# Patient Record
Sex: FEMALE | NOT HISPANIC OR LATINO | Employment: FULL TIME | URBAN - METROPOLITAN AREA
[De-identification: names, ages, dates, MRNs, and addresses within clinical notes are randomized per-mention and may not be internally consistent; named-entity substitution may affect disease eponyms.]

---

## 2024-10-23 RX ORDER — LIOTHYRONINE SODIUM 25 UG/1
25 TABLET ORAL DAILY
COMMUNITY

## 2024-10-23 RX ORDER — LEVOTHYROXINE SODIUM 100 UG/1
100 CAPSULE ORAL DAILY
COMMUNITY

## 2024-10-23 ASSESSMENT — DUKE ACTIVITY SCORE INDEX (DASI)
CAN YOU RUN A SHORT DISTANCE: YES
CAN YOU WALK INDOORS, SUCH AS AROUND YOUR HOUSE: YES
CAN YOU DO HEAVY WORK AROUND THE HOUSE LIKE SCRUBBING FLOORS OR LIFTING AND MOVING HEAVY FURNITURE: YES
CAN YOU TAKE CARE OF YOURSELF (EAT, DRESS, BATHE, OR USE TOILET): YES
CAN YOU CLIMB A FLIGHT OF STAIRS OR WALK UP A HILL: YES
CAN YOU DO MODERATE WORK AROUND THE HOUSE LIKE VACUUMING, SWEEPING FLOORS OR CARRYING GROCERIES: YES
CAN YOU HAVE SEXUAL RELATIONS: YES
CAN YOU DO LIGHT WORK AROUND THE HOUSE LIKE DUSTING OR WASHING DISHES: YES
CAN YOU WALK A BLOCK OR TWO ON LEVEL GROUND: YES
TOTAL_SCORE: 58.2
CAN YOU PARTICIPATE IN STRENOUS SPORTS LIKE SWIMMING, SINGLES TENNIS, FOOTBALL, BASKETBALL, OR SKIING: YES
CAN YOU PARTICIPATE IN MODERATE RECREATIONAL ACTIVITIES LIKE GOLF, BOWLING, DANCING, DOUBLES TENNIS OR THROWING A BASEBALL OR FOOTBALL: YES
DASI METS SCORE: 9.9
CAN YOU DO YARD WORK LIKE RAKING LEAVES, WEEDING OR PUSHING A MOWER: YES

## 2024-10-23 ASSESSMENT — LIFESTYLE VARIABLES: SMOKING_STATUS: NONSMOKER

## 2024-10-23 ASSESSMENT — ACTIVITIES OF DAILY LIVING (ADL): ADL_SCORE: 0

## 2024-10-24 ENCOUNTER — PRE-ADMISSION TESTING (OUTPATIENT)
Dept: PREADMISSION TESTING | Facility: HOSPITAL | Age: 44
End: 2024-10-24
Payer: COMMERCIAL

## 2024-10-24 ENCOUNTER — LAB (OUTPATIENT)
Dept: LAB | Facility: LAB | Age: 44
End: 2024-10-24
Payer: COMMERCIAL

## 2024-10-24 VITALS
WEIGHT: 132 LBS | DIASTOLIC BLOOD PRESSURE: 52 MMHG | TEMPERATURE: 97.3 F | HEART RATE: 79 BPM | BODY MASS INDEX: 22.53 KG/M2 | SYSTOLIC BLOOD PRESSURE: 112 MMHG | HEIGHT: 64 IN | RESPIRATION RATE: 14 BRPM | OXYGEN SATURATION: 100 %

## 2024-10-24 DIAGNOSIS — Z01.818 PRE-OP TESTING: Primary | ICD-10-CM

## 2024-10-24 DIAGNOSIS — Z01.818 PRE-OP TESTING: ICD-10-CM

## 2024-10-24 LAB
ERYTHROCYTE [DISTWIDTH] IN BLOOD BY AUTOMATED COUNT: 11.8 % (ref 11.5–14.5)
HCT VFR BLD AUTO: 37.7 % (ref 36–46)
HGB BLD-MCNC: 12.7 G/DL (ref 12–16)
MCH RBC QN AUTO: 32.2 PG (ref 26–34)
MCHC RBC AUTO-ENTMCNC: 33.7 G/DL (ref 32–36)
MCV RBC AUTO: 96 FL (ref 80–100)
NRBC BLD-RTO: 0 /100 WBCS (ref 0–0)
PLATELET # BLD AUTO: 223 X10*3/UL (ref 150–450)
RBC # BLD AUTO: 3.94 X10*6/UL (ref 4–5.2)
WBC # BLD AUTO: 6.5 X10*3/UL (ref 4.4–11.3)

## 2024-10-24 PROCEDURE — 36415 COLL VENOUS BLD VENIPUNCTURE: CPT

## 2024-10-24 PROCEDURE — 99202 OFFICE O/P NEW SF 15 MIN: CPT | Performed by: NURSE PRACTITIONER

## 2024-10-24 PROCEDURE — 85027 COMPLETE CBC AUTOMATED: CPT

## 2024-10-24 ASSESSMENT — PAIN SCALES - GENERAL: PAINLEVEL_OUTOF10: 0 - NO PAIN

## 2024-10-24 ASSESSMENT — PAIN - FUNCTIONAL ASSESSMENT: PAIN_FUNCTIONAL_ASSESSMENT: 0-10

## 2024-10-24 NOTE — H&P (VIEW-ONLY)
CPM/PAT Evaluation       Name: Eleonora Rodriguez (Eleonora Rodriguez)  /Age: 1980/44 y.o.     In-Person       Chief Complaint: pre-op appointment for upcoming OB/GYN surgery    HPI    RIZWANA is a 43 yo female who has had ongoing chronic pelvic pains since 12 years of age with the diagnosis of endometriosis. Testing has show uterine fibroids over the years and a known cystocele as well. She endorses daily pelvic pains with dyspareunia. With recent OBGYN follow up treatment options discussed and subsequently she has agree to pursue a laparoscopic hysterectomy with bilateral salpingo-oophorectomy and repair or cystocele. Presents to CPM today for perioperative risk stratification and optimization. She denies any recent know UTIs. Denies any dysuria or hematuria. Otherwise denies any recent illness, fever/chills, chest pains or shortness of breath.     Past Medical History:   Diagnosis Date    Anemia     Arthritis     Cystocele, unspecified     Delayed emergence from general anesthesia     Dyspareunia in female     Endometriosis     Fibroid     Hypothyroidism     Joint pain     PONV (postoperative nausea and vomiting)     Raynaud disease     Stress incontinence in female     Transfusion of blood product refused for Holiness reason      PCP: Dr. Banks  OBGYN: Dr. Srinivasan    Past Surgical History:   Procedure Laterality Date    APPENDECTOMY      BELT ABDOMINOPLASTY      COLONOSCOPY         Patient  reports being sexually active.    Family History   Problem Relation Name Age of Onset    Other (DYSLIPEDEMIA) Mother      Other (DYSLIPIDEMIA) Father         Allergies   Allergen Reactions    Latex Rash and Other    Sulfa (Sulfonamide Antibiotics) Rash       Prior to Admission medications    Medication Sig Start Date End Date Taking? Authorizing Provider   levothyroxine (Tirosint) 100 mcg capsule Take 1 capsule (100 mcg) by mouth early in the morning.. Take on an empty stomach at the same time each day, either 30 to 60  minutes prior to breakfast    Historical Provider, MD   liothyronine (Cytomel) 25 mcg tablet Take 1 tablet (25 mcg) by mouth once daily.    Historical Provider, MD DARRYL KEARNS   Constitutional: Negative for fever, chills, or sweats   ENMT: Negative for nasal discharge, congestion, ear pain, mouth pain, throat pain   Respiratory: Negative for cough, wheezing, shortness of breath   Cardiac: Negative for chest pain, dyspnea on exertion, palpitations   Gastrointestinal: Negative for nausea, vomiting, diarrhea, constipation, abdominal pain    Genitourinary: Negative for dysuria, flank pain, frequency, hematuria   + pelvic pains, pains with intercourse    Musculoskeletal: Negative for decreased ROM, pain, swelling, weakness   Neurological: Negative for dizziness, confusion, headache  Psychiatric: Negative for mood changes   Skin: Negative for itching, rash, ulcer    Hematologic/Lymph: Negative for bruising, easy bleeding  Allergic/Immunologic: Negative itching, sneezing, swelling      Physical Exam  Constitutional:       Appearance: Normal appearance.   HENT:      Head: Normocephalic.      Mouth/Throat:      Mouth: Mucous membranes are moist.   Eyes:      Extraocular Movements: Extraocular movements intact.   Cardiovascular:      Rate and Rhythm: Normal rate and regular rhythm.   Pulmonary:      Effort: Pulmonary effort is normal.      Breath sounds: Normal breath sounds.   Abdominal:      General: Abdomen is flat.      Palpations: Abdomen is soft.   Musculoskeletal:         General: Normal range of motion.      Cervical back: Normal range of motion.   Skin:     General: Skin is warm and dry.   Neurological:      General: No focal deficit present.      Mental Status: She is alert.   Psychiatric:         Mood and Affect: Mood normal.          PAT AIRWAY:   Airway:     Mallampati::  II    Neck ROM::  Full  normal        Visit Vitals  /52   Pulse 79   Temp 36.3 °C (97.3 °F) (Temporal)   Resp 14       DASI Risk Score       Flowsheet Row Pre-Admission Testing from 10/24/2024 in [facility]   Can you take care of yourself (eat, dress, bathe, or use toilet)?  2.75 filed at 10/23/2024 1418   Can you walk indoors, such as around your house? 1.75 filed at 10/23/2024 1418   Can you walk a block or two on level ground?  2.75 filed at 10/23/2024 1418   Can you climb a flight of stairs or walk up a hill? 5.5 filed at 10/23/2024 1418   Can you run a short distance? 8 filed at 10/23/2024 1418   Can you do light work around the house like dusting or washing dishes? 2.7 filed at 10/23/2024 1418   Can you do moderate work around the house like vacuuming, sweeping floors or carrying groceries? 3.5 filed at 10/23/2024 1418   Can you do heavy work around the house like scrubbing floors or lifting and moving heavy furniture?  8 filed at 10/23/2024 1418   Can you do yard work like raking leaves, weeding or pushing a mower? 4.5 filed at 10/23/2024 1418   Can you have sexual relations? 5.25 filed at 10/23/2024 1418   Can you participate in moderate recreational activities like golf, bowling, dancing, doubles tennis or throwing a baseball or football? 6 filed at 10/23/2024 1418   Can you participate in strenous sports like swimming, singles tennis, football, basketball, or skiing? 7.5 filed at 10/23/2024 1418   DASI SCORE 58.2 filed at 10/23/2024 1418   METS Score (Will be calculated only when all the questions are answered) 9.9 filed at 10/23/2024 1418          Caprini DVT Assessment      Flowsheet Row Pre-Admission Testing from 10/24/2024 in [facility]   DVT Score 7 filed at 10/23/2024 1417   Surgical Factors Major surgery planned, lasting over 3 hours filed at 10/23/2024 1417   BMI 30 or less filed at 10/23/2024 1417          Modified Frailty Index      Flowsheet Row Pre-Admission Testing from 10/24/2024 in [facility]   Non-independent functional status (problems with dressing, bathing, personal  grooming, or cooking) 0 filed at 10/23/2024 1418   History of diabetes mellitus  0 filed at 10/23/2024 1418   History of COPD 0 filed at 10/23/2024 1418   History of CHF No filed at 10/23/2024 1418   History of MI 0 filed at 10/23/2024 1418   History of Percutaneous Coronary Intervention, Cardiac Surgery, or Angina No filed at 10/23/2024 1418   Hypertension requiring the use of medication  0 filed at 10/23/2024 1418   Peripheral vascular disease 0 filed at 10/23/2024 1418   Impaired sensorium (cognitive impairement or loss, clouding, or delirium) 0 filed at 10/23/2024 1418   TIA or CVA withouy residual deficit 0 filed at 10/23/2024 1418   Cerebrovascular accident with deficit 0 filed at 10/23/2024 1418   Modified Frailty Index Calculator 0 filed at 10/23/2024 1418          CHADS2 Stroke Risk  Current as of today        N/A 3 to 100%: High Risk   2 to < 3%: Medium Risk   0 to < 2%: Low Risk     Last Change: N/A          This score determines the patient's risk of having a stroke if the patient has atrial fibrillation.        This score is not applicable to this patient. Components are not calculated.          Revised Cardiac Risk Index      Flowsheet Row Pre-Admission Testing from 10/24/2024 in [facility]   High-Risk Surgery (Intraperitoneal, Intrathoracic,Suprainguinal vascular) 0 filed at 10/23/2024 1418   History of ischemic heart disease (History of MI, History of positive exercuse test, Current chest paint considered due to myocardial ischemia, Use of nitrate therapy, ECG with pathological Q Waves) 0 filed at 10/23/2024 1418   History of congestive heart failure (pulmonary edemia, bilateral rales or S3 gallop, Paroxysmal nocturnal dyspnea, CXR showing pulmonary vascular redistribution) 0 filed at 10/23/2024 1418   History of cerebrovascular disease (Prior TIA or stroke) 0 filed at 10/23/2024 1418   Pre-operative insulin treatment 0 filed at 10/23/2024 1418   Pre-operative creatinine>2 mg/dl 0  filed at 10/23/2024 1418   Revised Cardiac Risk Calculator 0 filed at 10/23/2024 1418          Apfel Simplified Score      Flowsheet Row Pre-Admission Testing from 10/24/2024 in [facility]   Smoking status 1 filed at 10/23/2024 1418   History of motion sickness or PONV  0 filed at 10/23/2024 1418   Use of postoperative opioids 1 filed at 10/23/2024 1418   Gender - Female 1=Yes filed at 10/23/2024 1418   Apfel Simplified Score Calculator 3 filed at 10/23/2024 1418          Risk Analysis Index Results This Encounter         10/23/2024  1418             Do you live in a place other than your own home?: 0    When did you begin living in the place you are currently residing?: Greater than one year ago    Any kidney failure, kidney not working well, or seeing a kidney doctor (nephrologist)? If yes, was this for kidney stones or another problem?: 0 No    Any history of chronic (long-term) congestive heart failure (CHF)?: 0 No    Any shortness of breath when resting?: 0 No    In the past five years, have you been diagnosed with or treated for cancer?: No    During the last 3 months has it become difficult for you to remember things or organize your thoughts?: 0 No    Have you lost weight of 10 pounds or more in the past 3 months without trying?: 0 No    Do you have any loss of appetitie?: 0 No    Getting Around (Mobility): 0 Can get around without help    Eatin Can plan and prepare own meals    Toiletin Can use toilet without any help    Personal Hygiene (Bathing, Hand Washing, Changing Clothes): 0 Can shower or bathe without any help    PRINGLE Cancer History: Patient does not indicate history of cancer    Total Risk Analysis Index Score Without Cancer: 12    Total Risk Analysis Index Score: 12          Stop Bang Score      Flowsheet Row Pre-Admission Testing from 10/24/2024 in [facility]   Do you snore loudly? 0 filed at 10/24/2024 1054   Do you often feel tired or fatigued after your  sleep? 0 filed at 10/24/2024 1054   Has anyone ever observed you stop breathing in your sleep? 0 filed at 10/24/2024 1054   Do you have or are you being treated for high blood pressure? 0 filed at 10/24/2024 1054   Recent BMI (Calculated) 23 filed at 10/24/2024 1054   Is BMI greater than 35 kg/m2? 0=No filed at 10/24/2024 1054   Age older than 50 years old? 0=No filed at 10/24/2024 1054   Is your neck circumference greater than 17 inches (Male) or 16 inches (Female)? 0 filed at 10/24/2024 1054   Gender - Male 0=No filed at 10/24/2024 1054   STOP-BANG Total Score 0 filed at 10/24/2024 1054          Prodigy: High Risk  Total Score: 0          ARISCAT Score for Postoperative Pulmonary Complications      Flowsheet Row Pre-Admission Testing from 10/24/2024 in [facility]   Age, years  0 filed at 10/23/2024 1419   Preoperative SpO2 0 filed at 10/23/2024 1419   Respiratory infection in the last month Either upper or lower (i.e., URI, bronchitis, pneumonia), with fever and antibiotic treatment 0 filed at 10/23/2024 1419   Preoperative anemoa (Hgb less than 10 g/dl) 0 filed at 10/23/2024 1419   Surgical incision  15 filed at 10/23/2024 1419   Duration of surgery  16 filed at 10/23/2024 1419   Emergency Procedure  0 filed at 10/23/2024 1419   ARISCAT Total Score  28 filed at 10/23/2024 1419          Tatiana Perioperative Risk for Myocardial Infarction or Cardiac Arrest (MARTHA)      Flowsheet Row Pre-Admission Testing from 10/24/2024 in [facility]   Age 0.92 filed at 10/23/2024 1419   Functional Status  0 filed at 10/23/2024 1419   ASA Class  -5.17 filed at 10/23/2024 1419   Creatinine 0 filed at 10/23/2024 1419   Type of Procedure  1.13 filed at 10/23/2024 1419   MARTHA Total Score  -4.04 filed at 10/23/2024 1419            Assessment and Plan:     Pre-Op  44-year-old female scheduled for robotic laparoscopic hysterectomy, bilateral salpingo-oophorectomy with TVT and AP repair and cystoscopy on  2024 with Dr. Mcguire and Dr. Srinivasan. Blood work ordered. She has refused T&S related to refusion all blood product transfusions. Urine hcg ordered for the morning of surgery. Otherwise no further orders indicated.     Cardiac  -No diagnosis or significant findings on chart review or clinical presentation and evaluation.  DASI Score: 58.2   MET Score: 9.9  RCRI  0 which is 3.9% 30 day risk of MACE (risk for cardiac death, nonfatal myocardial infarction, and nonfactal cardiac arrest)  MARTHA score which indicates a   0% risk of intraoperative or 30-day postoperative MACE    Pulmonary  -No diagnosis or significant findings on chart review or clinical presentation and evaluation.  -Preoperative deep breathing educational handout provided to patient.  STOP BAN   points which is a low risk for moderate to severe GEOFF  ARISCAT:   28   points which is a intermediate (13.3%) risk of in-hospital post-op pulmonary complications     Endocrine  -Hypothyroidism, compliant with taking Synthroid and Cytomel    GI  Apfel: 3 points 61% risk for post operative N/V    /Renal  -Urinary stress incontinence    Hematologic    REFUSING ANY BLOOD PRODUCT TRANSFUSIONS- JEHOVAH WITNESS    -No hematological medical history, however due to high Caprini score of 7 patient is at HIGH risk for perioperative DVT, informative education handout provided    OB/ GYN  -Endometriosis with chronic pelvic pains, dx at 12 years of age  -Dyspareunia  -Uterine fibroids  -Cystocele  -Reason for upcoming surgery     Skin check: Patient was instructed to make surgeon aware of any skin changes/concerns prior to surgery.     Anesthesia:  Patient endorses delayed emergence and PONV    See risk scores as previously documented.

## 2024-10-24 NOTE — PREPROCEDURE INSTRUCTIONS
Thank you for visiting Preadmission Testing at [facility]. If you have any changes to your health condition, please call the SURGEON's office to alert them and give them details of your symptoms.        Preoperative Brain Exercises    What are brain exercises?  A brain exercise is any activity that engages your thinking (cognitive) skills.    What types of activities are considered brain exercises?  Jigsaw puzzles, crossword puzzles, word jumble, memory games, word search, and many more.  Many can be found free online or on your phone via a mobile fortino.    Why should I do brain exercises before my surgery?  More recent research has shown brain exercise before surgery can lower the risk of postoperative delirium (confusion) which can be especially important for older adults.  Patients who did brain exercises for 5 to 10 hours the days before surgery, cut their risk of postoperative delirium in half up to 1 week after surgery.      Preoperative Deep Breathing Exercises    Why it is important to do deep breathing exercises before my surgery?  Deep breathing exercises strengthen your breathing muscles.  This helps you to recover after your surgery and decreases the chance of breathing complications.    How are the deep breathing exercises done?  Sit straight with your back supported.  Breathe in deeply and slowly through your nose. Your lower rib cage should expand and your abdomen may move forward.  Hold that breath for 3 to 5 seconds.  Breathe out through pursed lips, slowly and completely.  Rest and repeat 10 times every hour while awake.  Rest longer if you become dizzy or lightheaded.      Patient and Family Education   Ways You Can Help Prevent Blood Clots     This handout explains some simple things you can do to help prevent blood clots.      Blood clots are blockages that can form in the body's veins. When a blood clot forms in your deep veins, it may be called a deep vein thrombosis, or DVT for short. Blood clots can  happen in any part of the body where blood flows, but they are most common in the arms and legs. If a piece of a blood clot breaks free and travels to the lungs, it is called a pulmonary embolus (PE). A PE can be a very serious problem.      Being in the hospital or having surgery can raise your chances of getting a blood clot because you may not be well enough to move around as much as you normally do.      Ways you can help prevent blood clots in the hospital         Wearing SCDs. SCDs stands for Sequential Compression Devices.   SCDs are special sleeves that wrap around your legs  They attach to a pump that fills them with air to gently squeeze your legs every few minutes.   This helps return the blood in your legs to your heart.   SCDs should only be taken off when walking or bathing.   SCDs may not be comfortable, but they can help save your life.               Wearing compression stockings - if your doctor orders them. These special snug fitting stockings gently squeeze your legs to help blood flow.       Walking. Walking helps move the blood in your legs.   If your doctor says it is ok, try walking the halls at least   5 times a day. Ask us to help you get up, so you don't fall.      Taking any blood thinning medicines your doctor orders.          ©[facility]; 3/23        Ways you can help prevent blood clots at home       Wearing compression stockings - if your doctor orders them. ? Walking - to help move the blood in your legs.       Taking any blood thinning medicines your doctor orders.      Signs of a blood clot or PE      Tell your doctor or nurse know right away if you have of the problems listed below.    If you are at home, seek medical care right away. Call 911 for chest pain or problems breathing.          Signs of a blood clot (DVT) - such as pain,  swelling, redness or warmth in your arm or leg      Signs of a pulmonary embolism (PE) - such as chest     pain or feeling short of breath

## 2024-10-24 NOTE — PREPROCEDURE INSTRUCTIONS
Medication List            Accurate as of October 24, 2024 11:04 AM. Always use your most recent med list.                levothyroxine 100 mcg capsule  Commonly known as: Tirosint  Medication Adjustments for Surgery: Take/Use as prescribed     liothyronine 25 mcg tablet  Commonly known as: Cytomel  Medication Adjustments for Surgery: Take/Use as prescribed                                PRE-OPERATIVE INSTRUCTIONS    You will receive notification one business day prior to your procedure to confirm your arrival time. It is important that you answer your phone and/or check your messages during this time. If you do not hear from the surgery center by 5 pm. the day before your procedure, please call [phone].     Please enter the building through the Outpatient entrance and take the elevator off the lobby to the 2nd floor then check in at the Outpatient Surgery desk on the 2nd floor.    INSTRUCTIONS:  Talk to your surgeon for instructions if you should stop your aspirin, blood thinner, or diabetes medicines.  DO NOT take any multivitamins or over the counter supplements for 7-10 days before surgery.  If not being admitted, you must have an adult immediately available to drive you home after surgery. We also highly recommend you have someone stay with you for the entire day and night of your surgery.  For children having surgery, a parent or legal guardian must accompany them to the surgery center. If this is not possible, please call [phone] to make additional arrangements.  For adults who are unable to consent or make medical decisions for themselves, a legal guardian or Power of  must accompany them to the surgery center. If this is not possible, please call [phone] to make additional arrangements.  Wear comfortable, loose fitting clothing.  All jewelry and piercings must be removed. If you are unable to remove an item or have a dermal piercing, please be sure to tell the nurse when you arrive  for surgery.  Nail polish and make-up must be removed.  Avoid smoking or consuming alcohol for 24 hours before surgery.  To help prevent infection, please take a shower/bath and wash your hair the night before and/or morning of surgery (or follow other specific bathing instructions provided).    Preoperative Fasting Guidelines    Why must I stop eating and drinking near surgery time?  With sedation, food or liquid in your stomach can enter your lungs causing serious complications  Increases nausea and vomiting    When do I need to stop eating and drinking before my surgery?  Do not eat any solid food after midnight the night before your surgery/procedure unless otherwise instructed by your surgeon.   You may have up to 13.5 ounces of clear liquid until TWO hours before your instructed arrival time to the hospital.  This includes water, black tea/coffee, (no milk or cream) apple juice, and electrolyte drinks (Gatorade).   You may chew gum until TWO hours before your surgery/procedure      If applicable, notify your surgeons office immediately of any new skin changes that occur to the surgical limb.      If you have any questions or concerns, please call Pre-Admission Testing at [phone].           Home Preoperative Antibacterial Shower with Chlorhexidine gluconate (CHG)     What is a home preoperative antibacterial shower?  This shower is a way of cleaning the skin with a germ killing solution before surgery. The solution contains chlorhexidine gluconate, commonly known as CHG. CHG is a skin cleanser with germ killing ability. Let your doctor know if you are allergic to chlorhexidine.    Why do I need to take a preoperative antibacterial shower?  Skin is not sterile. It is best to try to make your skin as free of germs as possible before surgery. Proper cleansing with a germ killing soap before surgery can lower the number of germs on your skin. This helps to reduce the risk of infection at the surgical site.  Following the instructions listed below will help you prepare your skin for surgery.    How do I use the solution?  Begin using your CHG soap the night before and again the morning of your procedure.   Do not shave the day before or day of surgery.  Remove all jewelry until after surgery. Take off rings and take out all body-piercing jewelry.  Wash your face and hair with normal soap and shampoo before you use the CHG soap.  Apply the CHG solution to a clean wet washcloth. Move away from the water to avoid premature rinsing of the CHG soap as you are applying. Firmly lather your entire body from the neck down. Do not use CHG on your face, eyes, ears, or genitals.   Pay special attention to the area where your incisions will be located.  Do not scrub your skin too hard.  It is important to allow the CHG soap to sit on your skin for 3-5 minutes.  Rinse the solution off your body completely. Do not wash with your normal soap after the CHG soap solution.  Pat yourself dry with a clean, soft towel.  Do not apply powders, lotions or deodorants as these might block how the CHG soap works.   Dress in clean clothing.  Be sure to sleep with clean, freshly laundered sheets.  Be aware that CHG can cause stains on fabric. Rinse your washcloth and other linens that have contact with CHG completely. Use only non-chlorine detergents to launder the items used.

## 2024-10-24 NOTE — CPM/PAT H&P
CPM/PAT Evaluation       Name: Eleonora Rodriguez (Eleonora Rodriguez)  /Age: 1980/44 y.o.     In-Person       Chief Complaint: pre-op appointment for upcoming OB/GYN surgery    HPI    RIZWANA is a 45 yo female who has had ongoing chronic pelvic pains since 12 years of age with the diagnosis of endometriosis. Testing has show uterine fibroids over the years and a known cystocele as well. She endorses daily pelvic pains with dyspareunia. With recent OBGYN follow up treatment options discussed and subsequently she has agree to pursue a laparoscopic hysterectomy with bilateral salpingo-oophorectomy and repair or cystocele. Presents to CPM today for perioperative risk stratification and optimization. She denies any recent know UTIs. Denies any dysuria or hematuria. Otherwise denies any recent illness, fever/chills, chest pains or shortness of breath.     Past Medical History:   Diagnosis Date    Anemia     Arthritis     Cystocele, unspecified     Delayed emergence from general anesthesia     Dyspareunia in female     Endometriosis     Fibroid     Hypothyroidism     Joint pain     PONV (postoperative nausea and vomiting)     Raynaud disease     Stress incontinence in female     Transfusion of blood product refused for Bahai reason      PCP: Dr. Banks  OBGYN: Dr. Srinivasan    Past Surgical History:   Procedure Laterality Date    APPENDECTOMY      BELT ABDOMINOPLASTY      COLONOSCOPY         Patient  reports being sexually active.    Family History   Problem Relation Name Age of Onset    Other (DYSLIPEDEMIA) Mother      Other (DYSLIPIDEMIA) Father         Allergies   Allergen Reactions    Latex Rash and Other    Sulfa (Sulfonamide Antibiotics) Rash       Prior to Admission medications    Medication Sig Start Date End Date Taking? Authorizing Provider   levothyroxine (Tirosint) 100 mcg capsule Take 1 capsule (100 mcg) by mouth early in the morning.. Take on an empty stomach at the same time each day, either 30 to 60  minutes prior to breakfast    Historical Provider, MD   liothyronine (Cytomel) 25 mcg tablet Take 1 tablet (25 mcg) by mouth once daily.    Historical Provider, MD DARRYL KEARNS   Constitutional: Negative for fever, chills, or sweats   ENMT: Negative for nasal discharge, congestion, ear pain, mouth pain, throat pain   Respiratory: Negative for cough, wheezing, shortness of breath   Cardiac: Negative for chest pain, dyspnea on exertion, palpitations   Gastrointestinal: Negative for nausea, vomiting, diarrhea, constipation, abdominal pain    Genitourinary: Negative for dysuria, flank pain, frequency, hematuria   + pelvic pains, pains with intercourse    Musculoskeletal: Negative for decreased ROM, pain, swelling, weakness   Neurological: Negative for dizziness, confusion, headache  Psychiatric: Negative for mood changes   Skin: Negative for itching, rash, ulcer    Hematologic/Lymph: Negative for bruising, easy bleeding  Allergic/Immunologic: Negative itching, sneezing, swelling      Physical Exam  Constitutional:       Appearance: Normal appearance.   HENT:      Head: Normocephalic.      Mouth/Throat:      Mouth: Mucous membranes are moist.   Eyes:      Extraocular Movements: Extraocular movements intact.   Cardiovascular:      Rate and Rhythm: Normal rate and regular rhythm.   Pulmonary:      Effort: Pulmonary effort is normal.      Breath sounds: Normal breath sounds.   Abdominal:      General: Abdomen is flat.      Palpations: Abdomen is soft.   Musculoskeletal:         General: Normal range of motion.      Cervical back: Normal range of motion.   Skin:     General: Skin is warm and dry.   Neurological:      General: No focal deficit present.      Mental Status: She is alert.   Psychiatric:         Mood and Affect: Mood normal.          PAT AIRWAY:   Airway:     Mallampati::  II    Neck ROM::  Full  normal        Visit Vitals  /52   Pulse 79   Temp 36.3 °C (97.3 °F) (Temporal)   Resp 14       DASI Risk Score       Flowsheet Row Pre-Admission Testing from 10/24/2024 in [facility]   Can you take care of yourself (eat, dress, bathe, or use toilet)?  2.75 filed at 10/23/2024 1418   Can you walk indoors, such as around your house? 1.75 filed at 10/23/2024 1418   Can you walk a block or two on level ground?  2.75 filed at 10/23/2024 1418   Can you climb a flight of stairs or walk up a hill? 5.5 filed at 10/23/2024 1418   Can you run a short distance? 8 filed at 10/23/2024 1418   Can you do light work around the house like dusting or washing dishes? 2.7 filed at 10/23/2024 1418   Can you do moderate work around the house like vacuuming, sweeping floors or carrying groceries? 3.5 filed at 10/23/2024 1418   Can you do heavy work around the house like scrubbing floors or lifting and moving heavy furniture?  8 filed at 10/23/2024 1418   Can you do yard work like raking leaves, weeding or pushing a mower? 4.5 filed at 10/23/2024 1418   Can you have sexual relations? 5.25 filed at 10/23/2024 1418   Can you participate in moderate recreational activities like golf, bowling, dancing, doubles tennis or throwing a baseball or football? 6 filed at 10/23/2024 1418   Can you participate in strenous sports like swimming, singles tennis, football, basketball, or skiing? 7.5 filed at 10/23/2024 1418   DASI SCORE 58.2 filed at 10/23/2024 1418   METS Score (Will be calculated only when all the questions are answered) 9.9 filed at 10/23/2024 1418          Caprini DVT Assessment      Flowsheet Row Pre-Admission Testing from 10/24/2024 in [facility]   DVT Score 7 filed at 10/23/2024 1417   Surgical Factors Major surgery planned, lasting over 3 hours filed at 10/23/2024 1417   BMI 30 or less filed at 10/23/2024 1417          Modified Frailty Index      Flowsheet Row Pre-Admission Testing from 10/24/2024 in [facility]   Non-independent functional status (problems with dressing, bathing, personal  grooming, or cooking) 0 filed at 10/23/2024 1418   History of diabetes mellitus  0 filed at 10/23/2024 1418   History of COPD 0 filed at 10/23/2024 1418   History of CHF No filed at 10/23/2024 1418   History of MI 0 filed at 10/23/2024 1418   History of Percutaneous Coronary Intervention, Cardiac Surgery, or Angina No filed at 10/23/2024 1418   Hypertension requiring the use of medication  0 filed at 10/23/2024 1418   Peripheral vascular disease 0 filed at 10/23/2024 1418   Impaired sensorium (cognitive impairement or loss, clouding, or delirium) 0 filed at 10/23/2024 1418   TIA or CVA withouy residual deficit 0 filed at 10/23/2024 1418   Cerebrovascular accident with deficit 0 filed at 10/23/2024 1418   Modified Frailty Index Calculator 0 filed at 10/23/2024 1418          CHADS2 Stroke Risk  Current as of today        N/A 3 to 100%: High Risk   2 to < 3%: Medium Risk   0 to < 2%: Low Risk     Last Change: N/A          This score determines the patient's risk of having a stroke if the patient has atrial fibrillation.        This score is not applicable to this patient. Components are not calculated.          Revised Cardiac Risk Index      Flowsheet Row Pre-Admission Testing from 10/24/2024 in [facility] [place]   High-Risk Surgery (Intraperitoneal, Intrathoracic,Suprainguinal vascular) 0 filed at 10/23/2024 1418   History of ischemic heart disease (History of MI, History of positive exercuse test, Current chest paint considered due to myocardial ischemia, Use of nitrate therapy, ECG with pathological Q Waves) 0 filed at 10/23/2024 1418   History of congestive heart failure (pulmonary edemia, bilateral rales or S3 gallop, Paroxysmal nocturnal dyspnea, CXR showing pulmonary vascular redistribution) 0 filed at 10/23/2024 1418   History of cerebrovascular disease (Prior TIA or stroke) 0 filed at 10/23/2024 1418   Pre-operative insulin treatment 0 filed at 10/23/2024 1418   Pre-operative creatinine>2 mg/dl 0  filed at 10/23/2024 1418   Revised Cardiac Risk Calculator 0 filed at 10/23/2024 1418          Apfel Simplified Score      [place] [facility] [place]   Smoking status 1 filed at 10/23/2024 1418   History of motion sickness or PONV  0 filed at 10/23/2024 1418   Use of postoperative opioids 1 filed at 10/23/2024 1418   Gender - Female 1=Yes filed at 10/23/2024 1418   Apfel Simplified Score Calculator 3 filed at 10/23/2024 1418          Risk Analysis Index Results This Encounter         10/23/2024  1418             Do you live in a place other than your own home?: 0    When did you begin living in the place you are currently residing?: Greater than one year ago    Any kidney failure, kidney not working well, or seeing a kidney doctor (nephrologist)? If yes, was this for kidney stones or another problem?: 0 No    Any history of chronic (long-term) congestive heart failure (CHF)?: 0 No    Any shortness of breath when resting?: 0 No    In the past five years, have you been diagnosed with or treated for cancer?: No    During the last 3 months has it become difficult for you to remember things or organize your thoughts?: 0 No    Have you lost weight of 10 pounds or more in the past 3 months without trying?: 0 No    Do you have any loss of appetitie?: 0 No    Getting Around (Mobility): 0 Can get around without help    Eatin Can plan and prepare own meals    Toiletin Can use toilet without any help    Personal Hygiene (Bathing, Hand Washing, Changing Clothes): 0 Can shower or bathe without any help    PRINGLE Cancer History: Patient does not indicate history of cancer    Total Risk Analysis Index Score Without Cancer: 12    Total Risk Analysis Index Score: 12          Stop Bang Score      Flowsheet [place] [facility] [place]   Do you snore loudly? 0 filed at 10/24/2024 1054   Do you often feel tired or fatigued after your  sleep? 0 filed at 10/24/2024 1054   Has anyone ever observed you stop breathing in your sleep? 0 filed at 10/24/2024 1054   Do you have or are you being treated for high blood pressure? 0 filed at 10/24/2024 1054   Recent BMI (Calculated) 23 filed at 10/24/2024 1054   Is BMI greater than 35 kg/m2? 0=No filed at 10/24/2024 1054   Age older than 50 years old? 0=No filed at 10/24/2024 1054   Is your neck circumference greater than 17 inches (Male) or 16 inches (Female)? 0 filed at 10/24/2024 1054   Gender - Male 0=No filed at 10/24/2024 1054   STOP-BANG Total Score 0 filed at 10/24/2024 1054          Prodigy: High Risk  Total Score: 0          ARISCAT Score for Postoperative Pulmonary Complications      [place] [facility] [place]   Age, years  0 filed at 10/23/2024 1419   Preoperative SpO2 0 filed at 10/23/2024 1419   Respiratory infection in the last month Either upper or lower (i.e., URI, bronchitis, pneumonia), with fever and antibiotic treatment 0 filed at 10/23/2024 1419   Preoperative anemoa (Hgb less than 10 g/dl) 0 filed at 10/23/2024 1419   Surgical incision  15 filed at 10/23/2024 1419   Duration of surgery  16 filed at 10/23/2024 1419   Emergency Procedure  0 filed at 10/23/2024 1419   ARISCAT Total Score  28 filed at 10/23/2024 1419          Tatiana Perioperative Risk for Myocardial Infarction or Cardiac Arrest (MARTHA)      Flowsheet Row [place]-Ad[place] [facility] [place]   Age 0.92 filed at 10/23/2024 1419   Functional Status  0 filed at 10/23/2024 1419   ASA Class  -5.17 filed at 10/23/2024 1419   Creatinine 0 filed at 10/23/2024 1419   Type of Procedure  1.13 filed at 10/23/2024 1419   MARTHA Total Score  -4.04 filed at 10/23/2024 1419            Assessment and Plan:     Pre-Op  44-year-old female scheduled for robotic laparoscopic hysterectomy, bilateral salpingo-oophorectomy with TVT and AP repair and cystoscopy on  2024 with Dr. Mcguire and Dr. Srinivasan. Blood work ordered. She has refused T&S related to refusion all blood product transfusions. Urine hcg ordered for the morning of surgery. Otherwise no further orders indicated.     Cardiac  -No diagnosis or significant findings on chart review or clinical presentation and evaluation.  DASI Score: 58.2   MET Score: 9.9  RCRI  0 which is 3.9% 30 day risk of MACE (risk for cardiac death, nonfatal myocardial infarction, and nonfactal cardiac arrest)  MARTHA score which indicates a   0% risk of intraoperative or 30-day postoperative MACE    Pulmonary  -No diagnosis or significant findings on chart review or clinical presentation and evaluation.  -Preoperative deep breathing educational handout provided to patient.  STOP BAN   points which is a low risk for moderate to severe GEOFF  ARISCAT:   28   points which is a intermediate (13.3%) risk of in-hospital post-op pulmonary complications     Endocrine  -Hypothyroidism, compliant with taking Synthroid and Cytomel    GI  Apfel: 3 points 61% risk for post operative N/V    /Renal  -Urinary stress incontinence    Hematologic    REFUSING ANY BLOOD PRODUCT TRANSFUSIONS- JEHOVAH WITNESS    -No hematological medical history, however due to high Caprini score of 7 patient is at HIGH risk for perioperative DVT, informative education handout provided    OB/ GYN  -Endometriosis with chronic pelvic pains, dx at 12 years of age  -Dyspareunia  -Uterine fibroids  -Cystocele  -Reason for upcoming surgery     Skin check: Patient was instructed to make surgeon aware of any skin changes/concerns prior to surgery.     Anesthesia:  Patient endorses delayed emergence and PONV    See risk scores as previously documented.

## 2024-11-06 ENCOUNTER — PREP FOR PROCEDURE (OUTPATIENT)
Dept: OPERATING ROOM | Facility: HOSPITAL | Age: 44
End: 2024-11-06
Payer: COMMERCIAL

## 2024-11-06 RX ORDER — ACETAMINOPHEN 325 MG/1
975 TABLET ORAL ONCE
Status: CANCELLED | OUTPATIENT
Start: 2024-11-06 | End: 2024-11-06

## 2024-11-06 RX ORDER — CELECOXIB 200 MG/1
400 CAPSULE ORAL ONCE
Status: CANCELLED | OUTPATIENT
Start: 2024-11-06 | End: 2024-11-06

## 2024-11-06 RX ORDER — GABAPENTIN 600 MG/1
600 TABLET ORAL ONCE
Status: CANCELLED | OUTPATIENT
Start: 2024-11-06 | End: 2024-11-06

## 2024-11-06 RX ORDER — CEFAZOLIN SODIUM 2 G/100ML
2 INJECTION, SOLUTION INTRAVENOUS ONCE
Status: CANCELLED | OUTPATIENT
Start: 2024-11-07 | End: 2024-11-06

## 2024-11-07 ENCOUNTER — ANESTHESIA EVENT (OUTPATIENT)
Dept: OPERATING ROOM | Facility: HOSPITAL | Age: 44
End: 2024-11-07
Payer: COMMERCIAL

## 2024-11-07 ENCOUNTER — PHARMACY VISIT (OUTPATIENT)
Dept: PHARMACY | Facility: CLINIC | Age: 44
End: 2024-11-07
Payer: COMMERCIAL

## 2024-11-07 ENCOUNTER — HOSPITAL ENCOUNTER (OUTPATIENT)
Facility: HOSPITAL | Age: 44
Setting detail: OUTPATIENT SURGERY
Discharge: HOME | End: 2024-11-07
Attending: OBSTETRICS & GYNECOLOGY | Admitting: OBSTETRICS & GYNECOLOGY
Payer: COMMERCIAL

## 2024-11-07 ENCOUNTER — ANESTHESIA (OUTPATIENT)
Dept: OPERATING ROOM | Facility: HOSPITAL | Age: 44
End: 2024-11-07
Payer: COMMERCIAL

## 2024-11-07 VITALS
TEMPERATURE: 98.4 F | BODY MASS INDEX: 22.53 KG/M2 | HEART RATE: 69 BPM | OXYGEN SATURATION: 98 % | DIASTOLIC BLOOD PRESSURE: 54 MMHG | WEIGHT: 132 LBS | RESPIRATION RATE: 18 BRPM | SYSTOLIC BLOOD PRESSURE: 109 MMHG | HEIGHT: 64 IN

## 2024-11-07 DIAGNOSIS — N80.9 ENDOMETRIOSIS: ICD-10-CM

## 2024-11-07 DIAGNOSIS — R10.2 PELVIC PAIN: ICD-10-CM

## 2024-11-07 DIAGNOSIS — N39.3 SUI (STRESS URINARY INCONTINENCE, FEMALE): ICD-10-CM

## 2024-11-07 DIAGNOSIS — G89.18 POSTOPERATIVE PAIN: Primary | ICD-10-CM

## 2024-11-07 DIAGNOSIS — N81.10 CYSTOCELE WITH RECTOCELE: ICD-10-CM

## 2024-11-07 DIAGNOSIS — N81.6 CYSTOCELE WITH RECTOCELE: ICD-10-CM

## 2024-11-07 DIAGNOSIS — N94.10 DYSPAREUNIA, FEMALE: ICD-10-CM

## 2024-11-07 LAB
ABO GROUP (TYPE) IN BLOOD: NORMAL
ANTIBODY SCREEN: NORMAL
GLUCOSE BLD MANUAL STRIP-MCNC: 178 MG/DL (ref 74–99)
HCG UR QL IA.RAPID: NEGATIVE
RH FACTOR (ANTIGEN D): NORMAL

## 2024-11-07 PROCEDURE — 2500000004 HC RX 250 GENERAL PHARMACY W/ HCPCS (ALT 636 FOR OP/ED): Mod: JZ | Performed by: OBSTETRICS & GYNECOLOGY

## 2024-11-07 PROCEDURE — 3700000001 HC GENERAL ANESTHESIA TIME - INITIAL BASE CHARGE: Performed by: OBSTETRICS & GYNECOLOGY

## 2024-11-07 PROCEDURE — 2500000001 HC RX 250 WO HCPCS SELF ADMINISTERED DRUGS (ALT 637 FOR MEDICARE OP): Performed by: OBSTETRICS & GYNECOLOGY

## 2024-11-07 PROCEDURE — C1771 REP DEV, URINARY, W/SLING: HCPCS | Performed by: OBSTETRICS & GYNECOLOGY

## 2024-11-07 PROCEDURE — 7100000009 HC PHASE TWO TIME - INITIAL BASE CHARGE: Performed by: OBSTETRICS & GYNECOLOGY

## 2024-11-07 PROCEDURE — 7100000002 HC RECOVERY ROOM TIME - EACH INCREMENTAL 1 MINUTE: Performed by: OBSTETRICS & GYNECOLOGY

## 2024-11-07 PROCEDURE — 88307 TISSUE EXAM BY PATHOLOGIST: CPT | Performed by: PATHOLOGY

## 2024-11-07 PROCEDURE — 88307 TISSUE EXAM BY PATHOLOGIST: CPT | Mod: TC,STJLAB,WESLAB | Performed by: OBSTETRICS & GYNECOLOGY

## 2024-11-07 PROCEDURE — 7100000010 HC PHASE TWO TIME - EACH INCREMENTAL 1 MINUTE: Performed by: OBSTETRICS & GYNECOLOGY

## 2024-11-07 PROCEDURE — 2720000007 HC OR 272 NO HCPCS: Performed by: OBSTETRICS & GYNECOLOGY

## 2024-11-07 PROCEDURE — 3600000017 HC OR TIME - EACH INCREMENTAL 1 MINUTE - PROCEDURE LEVEL SIX: Performed by: OBSTETRICS & GYNECOLOGY

## 2024-11-07 PROCEDURE — 2780000003 HC OR 278 NO HCPCS: Performed by: OBSTETRICS & GYNECOLOGY

## 2024-11-07 PROCEDURE — 2500000004 HC RX 250 GENERAL PHARMACY W/ HCPCS (ALT 636 FOR OP/ED): Performed by: OBSTETRICS & GYNECOLOGY

## 2024-11-07 PROCEDURE — 2500000004 HC RX 250 GENERAL PHARMACY W/ HCPCS (ALT 636 FOR OP/ED): Performed by: NURSE ANESTHETIST, CERTIFIED REGISTERED

## 2024-11-07 PROCEDURE — A58571 PR LAPAROSCOPY W TOT HYSTERECTUTERUS <=250 GRAM  W TUBE/OVARY: Performed by: NURSE ANESTHETIST, CERTIFIED REGISTERED

## 2024-11-07 PROCEDURE — 82947 ASSAY GLUCOSE BLOOD QUANT: CPT

## 2024-11-07 PROCEDURE — 81025 URINE PREGNANCY TEST: CPT | Performed by: OBSTETRICS & GYNECOLOGY

## 2024-11-07 PROCEDURE — 86901 BLOOD TYPING SEROLOGIC RH(D): CPT | Performed by: OBSTETRICS & GYNECOLOGY

## 2024-11-07 PROCEDURE — 3700000002 HC GENERAL ANESTHESIA TIME - EACH INCREMENTAL 1 MINUTE: Performed by: OBSTETRICS & GYNECOLOGY

## 2024-11-07 PROCEDURE — RXMED WILLOW AMBULATORY MEDICATION CHARGE

## 2024-11-07 PROCEDURE — 7100000001 HC RECOVERY ROOM TIME - INITIAL BASE CHARGE: Performed by: OBSTETRICS & GYNECOLOGY

## 2024-11-07 PROCEDURE — 3600000018 HC OR TIME - INITIAL BASE CHARGE - PROCEDURE LEVEL SIX: Performed by: OBSTETRICS & GYNECOLOGY

## 2024-11-07 PROCEDURE — A58571 PR LAPAROSCOPY W TOT HYSTERECTUTERUS <=250 GRAM  W TUBE/OVARY: Performed by: ANESTHESIOLOGY

## 2024-11-07 PROCEDURE — 2500000001 HC RX 250 WO HCPCS SELF ADMINISTERED DRUGS (ALT 637 FOR MEDICARE OP): Performed by: ANESTHESIOLOGY

## 2024-11-07 DEVICE — SLING, DESARA, BLUE TV: Type: IMPLANTABLE DEVICE | Site: VAGINA | Status: FUNCTIONAL

## 2024-11-07 RX ORDER — CELECOXIB 200 MG/1
400 CAPSULE ORAL ONCE
Status: COMPLETED | OUTPATIENT
Start: 2024-11-07 | End: 2024-11-07

## 2024-11-07 RX ORDER — ALBUTEROL SULFATE 0.83 MG/ML
2.5 SOLUTION RESPIRATORY (INHALATION) ONCE AS NEEDED
Status: DISCONTINUED | OUTPATIENT
Start: 2024-11-07 | End: 2024-11-07 | Stop reason: HOSPADM

## 2024-11-07 RX ORDER — PHENAZOPYRIDINE HYDROCHLORIDE 100 MG/1
200 TABLET, FILM COATED ORAL ONCE AS NEEDED
Status: DISCONTINUED | OUTPATIENT
Start: 2024-11-07 | End: 2024-11-07 | Stop reason: HOSPADM

## 2024-11-07 RX ORDER — MIDAZOLAM HYDROCHLORIDE 1 MG/ML
1 INJECTION, SOLUTION INTRAMUSCULAR; INTRAVENOUS ONCE AS NEEDED
Status: DISCONTINUED | OUTPATIENT
Start: 2024-11-07 | End: 2024-11-07 | Stop reason: HOSPADM

## 2024-11-07 RX ORDER — OXYCODONE AND ACETAMINOPHEN 5; 325 MG/1; MG/1
1 TABLET ORAL EVERY 4 HOURS PRN
Status: DISCONTINUED | OUTPATIENT
Start: 2024-11-07 | End: 2024-11-07 | Stop reason: HOSPADM

## 2024-11-07 RX ORDER — ONDANSETRON HYDROCHLORIDE 2 MG/ML
INJECTION, SOLUTION INTRAVENOUS AS NEEDED
Status: DISCONTINUED | OUTPATIENT
Start: 2024-11-07 | End: 2024-11-07

## 2024-11-07 RX ORDER — SODIUM CHLORIDE, SODIUM LACTATE, POTASSIUM CHLORIDE, CALCIUM CHLORIDE 600; 310; 30; 20 MG/100ML; MG/100ML; MG/100ML; MG/100ML
100 INJECTION, SOLUTION INTRAVENOUS CONTINUOUS
Status: ACTIVE | OUTPATIENT
Start: 2024-11-07 | End: 2024-11-07

## 2024-11-07 RX ORDER — IBUPROFEN 600 MG/1
600 TABLET ORAL EVERY 6 HOURS
Qty: 30 TABLET | Refills: 0 | Status: SHIPPED | OUTPATIENT
Start: 2024-11-07

## 2024-11-07 RX ORDER — OXYCODONE HYDROCHLORIDE 10 MG/1
10 TABLET ORAL EVERY 4 HOURS PRN
Status: DISCONTINUED | OUTPATIENT
Start: 2024-11-07 | End: 2024-11-07 | Stop reason: HOSPADM

## 2024-11-07 RX ORDER — DIPHENHYDRAMINE HYDROCHLORIDE 50 MG/ML
INJECTION INTRAMUSCULAR; INTRAVENOUS AS NEEDED
Status: DISCONTINUED | OUTPATIENT
Start: 2024-11-07 | End: 2024-11-07

## 2024-11-07 RX ORDER — ONDANSETRON 4 MG/1
4 TABLET, FILM COATED ORAL EVERY 8 HOURS PRN
Qty: 10 TABLET | Refills: 0 | Status: SHIPPED | OUTPATIENT
Start: 2024-11-07

## 2024-11-07 RX ORDER — LIDOCAINE HYDROCHLORIDE 20 MG/ML
INJECTION, SOLUTION EPIDURAL; INFILTRATION; INTRACAUDAL; PERINEURAL AS NEEDED
Status: DISCONTINUED | OUTPATIENT
Start: 2024-11-07 | End: 2024-11-07

## 2024-11-07 RX ORDER — PROPOFOL 10 MG/ML
INJECTION, EMULSION INTRAVENOUS AS NEEDED
Status: DISCONTINUED | OUTPATIENT
Start: 2024-11-07 | End: 2024-11-07

## 2024-11-07 RX ORDER — SCOLOPAMINE TRANSDERMAL SYSTEM 1 MG/1
PATCH, EXTENDED RELEASE TRANSDERMAL AS NEEDED
Status: DISCONTINUED | OUTPATIENT
Start: 2024-11-07 | End: 2024-11-07

## 2024-11-07 RX ORDER — HYDROMORPHONE HYDROCHLORIDE 1 MG/ML
INJECTION, SOLUTION INTRAMUSCULAR; INTRAVENOUS; SUBCUTANEOUS AS NEEDED
Status: DISCONTINUED | OUTPATIENT
Start: 2024-11-07 | End: 2024-11-07

## 2024-11-07 RX ORDER — HYDROMORPHONE HYDROCHLORIDE 0.2 MG/ML
0.1 INJECTION INTRAMUSCULAR; INTRAVENOUS; SUBCUTANEOUS EVERY 5 MIN PRN
Status: DISCONTINUED | OUTPATIENT
Start: 2024-11-07 | End: 2024-11-07 | Stop reason: HOSPADM

## 2024-11-07 RX ORDER — ACETAMINOPHEN 325 MG/1
975 TABLET ORAL ONCE
Status: COMPLETED | OUTPATIENT
Start: 2024-11-07 | End: 2024-11-07

## 2024-11-07 RX ORDER — NEOSTIGMINE METHYLSULFATE 1 MG/ML
INJECTION INTRAVENOUS AS NEEDED
Status: DISCONTINUED | OUTPATIENT
Start: 2024-11-07 | End: 2024-11-07

## 2024-11-07 RX ORDER — IBUPROFEN 600 MG/1
600 TABLET ORAL EVERY 6 HOURS PRN
Status: DISCONTINUED | OUTPATIENT
Start: 2024-11-07 | End: 2024-11-07 | Stop reason: HOSPADM

## 2024-11-07 RX ORDER — GLYCOPYRROLATE 0.2 MG/ML
INJECTION INTRAMUSCULAR; INTRAVENOUS AS NEEDED
Status: DISCONTINUED | OUTPATIENT
Start: 2024-11-07 | End: 2024-11-07

## 2024-11-07 RX ORDER — BUPIVACAINE HYDROCHLORIDE 2.5 MG/ML
INJECTION, SOLUTION INFILTRATION; PERINEURAL AS NEEDED
Status: DISCONTINUED | OUTPATIENT
Start: 2024-11-07 | End: 2024-11-07 | Stop reason: HOSPADM

## 2024-11-07 RX ORDER — FENTANYL CITRATE 50 UG/ML
INJECTION, SOLUTION INTRAMUSCULAR; INTRAVENOUS AS NEEDED
Status: DISCONTINUED | OUTPATIENT
Start: 2024-11-07 | End: 2024-11-07

## 2024-11-07 RX ORDER — MIDAZOLAM HYDROCHLORIDE 1 MG/ML
INJECTION, SOLUTION INTRAMUSCULAR; INTRAVENOUS AS NEEDED
Status: DISCONTINUED | OUTPATIENT
Start: 2024-11-07 | End: 2024-11-07

## 2024-11-07 RX ORDER — GABAPENTIN 600 MG/1
600 TABLET ORAL ONCE
Status: COMPLETED | OUTPATIENT
Start: 2024-11-07 | End: 2024-11-07

## 2024-11-07 RX ORDER — HYDROMORPHONE HYDROCHLORIDE 1 MG/ML
1 INJECTION, SOLUTION INTRAMUSCULAR; INTRAVENOUS; SUBCUTANEOUS EVERY 5 MIN PRN
Status: DISCONTINUED | OUTPATIENT
Start: 2024-11-07 | End: 2024-11-07 | Stop reason: HOSPADM

## 2024-11-07 RX ORDER — ACETAMINOPHEN 500 MG
1000 TABLET ORAL EVERY 6 HOURS
Qty: 40 TABLET | Refills: 0 | Status: SHIPPED | OUTPATIENT
Start: 2024-11-07

## 2024-11-07 RX ORDER — HYDRALAZINE HYDROCHLORIDE 20 MG/ML
5 INJECTION INTRAMUSCULAR; INTRAVENOUS EVERY 30 MIN PRN
Status: DISCONTINUED | OUTPATIENT
Start: 2024-11-07 | End: 2024-11-07 | Stop reason: HOSPADM

## 2024-11-07 RX ORDER — ACETAMINOPHEN 325 MG/1
975 TABLET ORAL ONCE
Status: DISCONTINUED | OUTPATIENT
Start: 2024-11-07 | End: 2024-11-07 | Stop reason: HOSPADM

## 2024-11-07 RX ORDER — FAMOTIDINE 10 MG/ML
INJECTION INTRAVENOUS AS NEEDED
Status: DISCONTINUED | OUTPATIENT
Start: 2024-11-07 | End: 2024-11-07

## 2024-11-07 RX ORDER — ROCURONIUM BROMIDE 10 MG/ML
INJECTION, SOLUTION INTRAVENOUS AS NEEDED
Status: DISCONTINUED | OUTPATIENT
Start: 2024-11-07 | End: 2024-11-07

## 2024-11-07 RX ORDER — ONDANSETRON HYDROCHLORIDE 2 MG/ML
4 INJECTION, SOLUTION INTRAVENOUS ONCE AS NEEDED
Status: DISCONTINUED | OUTPATIENT
Start: 2024-11-07 | End: 2024-11-07 | Stop reason: HOSPADM

## 2024-11-07 RX ORDER — KETOROLAC TROMETHAMINE 30 MG/ML
30 INJECTION, SOLUTION INTRAMUSCULAR; INTRAVENOUS ONCE
Status: COMPLETED | OUTPATIENT
Start: 2024-11-07 | End: 2024-11-07

## 2024-11-07 RX ORDER — OXYCODONE HYDROCHLORIDE 5 MG/1
5 TABLET ORAL EVERY 6 HOURS PRN
Qty: 10 TABLET | Refills: 0 | Status: SHIPPED | OUTPATIENT
Start: 2024-11-07

## 2024-11-07 RX ORDER — CEFAZOLIN SODIUM 2 G/100ML
2 INJECTION, SOLUTION INTRAVENOUS ONCE
Status: COMPLETED | OUTPATIENT
Start: 2024-11-07 | End: 2024-11-07

## 2024-11-07 SDOH — HEALTH STABILITY: MENTAL HEALTH: CURRENT SMOKER: 0

## 2024-11-07 ASSESSMENT — COLUMBIA-SUICIDE SEVERITY RATING SCALE - C-SSRS
1. IN THE PAST MONTH, HAVE YOU WISHED YOU WERE DEAD OR WISHED YOU COULD GO TO SLEEP AND NOT WAKE UP?: NO
2. HAVE YOU ACTUALLY HAD ANY THOUGHTS OF KILLING YOURSELF?: NO
6. HAVE YOU EVER DONE ANYTHING, STARTED TO DO ANYTHING, OR PREPARED TO DO ANYTHING TO END YOUR LIFE?: NO

## 2024-11-07 ASSESSMENT — PAIN SCALES - GENERAL
PAINLEVEL_OUTOF10: 2
PAINLEVEL_OUTOF10: 0 - NO PAIN
PAINLEVEL_OUTOF10: 5 - MODERATE PAIN
PAINLEVEL_OUTOF10: 5 - MODERATE PAIN
PAINLEVEL_OUTOF10: 0 - NO PAIN
PAINLEVEL_OUTOF10: 7
PAINLEVEL_OUTOF10: 7
PAINLEVEL_OUTOF10: 0 - NO PAIN
PAINLEVEL_OUTOF10: 7
PAINLEVEL_OUTOF10: 4
PAINLEVEL_OUTOF10: 0 - NO PAIN
PAINLEVEL_OUTOF10: 4
PAINLEVEL_OUTOF10: 5 - MODERATE PAIN
PAINLEVEL_OUTOF10: 5 - MODERATE PAIN
PAIN_LEVEL: 0
PAINLEVEL_OUTOF10: 4
PAINLEVEL_OUTOF10: 4
PAINLEVEL_OUTOF10: 7
PAINLEVEL_OUTOF10: 7
PAINLEVEL_OUTOF10: 0 - NO PAIN
PAINLEVEL_OUTOF10: 0 - NO PAIN
PAINLEVEL_OUTOF10: 7

## 2024-11-07 ASSESSMENT — PAIN - FUNCTIONAL ASSESSMENT

## 2024-11-07 NOTE — ANESTHESIA PREPROCEDURE EVALUATION
Patient: Eleonora Rodriguez    Procedure Information       Anesthesia Start Date/Time: 11/07/24 0727    Procedure: Robotic Laparoscopic Hysterectomy with Bilateral Salpingo-Oophorectomy with TVT AP repair and Cystoscopy (Bilateral)    Location: [facility] OR 08 / [facility]    Surgeons: Antoinette Danielle,             Relevant Problems   No relevant active problems       Clinical information reviewed:   Tobacco  Allergies  Meds  Problems  Med Hx  Surg Hx  OB Status    Fam Hx  Soc Hx        NPO Detail:  NPO/Void Status  Carbohydrate Drink Given Prior to Surgery? : N  Date of Last Liquid: 11/07/24  Time of Last Liquid: 0620  Date of Last Solid: 11/06/24  Time of Last Solid: 1830  Last Intake Type: Clear fluids  Time of Last Void: 0610         Physical Exam    Airway  Mallampati: II  TM distance: >3 FB  Neck ROM: full     Cardiovascular - normal exam  Rhythm: regular  Rate: normal     Dental - normal exam     Pulmonary - normal exam  Breath sounds clear to auscultation     Abdominal - normal exam  Abdomen: soft  Bowel sounds: normal           Anesthesia Plan    History of general anesthesia?: yes  History of complications of general anesthesia?: yes    ASA 2     general   (hX OF ponv)  The patient is not a current smoker.  Patient was previously instructed to abstain from smoking on day of procedure.  Patient did not smoke on day of procedure.  Education provided regarding risk of obstructive sleep apnea.  intravenous induction   Postoperative administration of opioids is intended.  Anesthetic plan and risks discussed with patient and spouse.  Use of blood products discussed with patient and spouse who did not consent to blood products.    Plan discussed with CAA.

## 2024-11-07 NOTE — DISCHARGE INSTRUCTIONS
POST-OPERATIVE INSTRUCTIONS    PAIN MANAGEMENT  Take your oral pain medications as directed.   You should take Ibuprofen 600mg tab every 6 hours along with Tylenol 1000mg every 6 hours. (Alternate them every 3 hours for full coverage)  If your pain is uncontrolled on the above medications, you may add narcotic medications such as Oxycodone 5mg every 6 hours for severe or uncontrolled pain  After 72 hours (3 days) you may decrease the Ibuprofen and tylenol to an as needed medication, however if you are still requiring use of the Oxycodone 5mg tab you should continue to take the Ibuprofen as scheduled.    BOWEL REGIMEN  Certain medications (such as nacrotics) can make you constipated, we dont want you to be bearing down or straining after surgery. Please take Colace (stool softener) two times per day and Miralax once per day to prevent constipation.  You may discontinue this if you have diarrhea.  Continue this medications if you are straining and having difficulty passing stool.   You may also take Milk of Magnesia or dulcolax suppositories for more severe constipation.     You do not need to wake up in the middle of the night to take medications if you are sleeping. You can reset your schedule when you wake up.     To help your bowels return to normal function, you can chew sugar less gum 2-3 times per day.   If you have nausea or have episodes of vomiting you have been prescribed Zofran 4mg under the tongue every 8 hours as needed. This medication can cause constipation.     ACTIVITY  No heavy lifting/pushing/pulling for 6-8 weeks.  Do not lift anything more than about 10 lbs (such as laundry, groceries, children, pets), vacuum, push heavy doors or grocery carts, etc.  You may climb stairs as tolerated.  Do not put anything in the vagina for 6-8 weeks after surgery unless otherwise instructed by your doctor (including tampons, douching, sexual intercourse, etc).    No driving for 1 week after surgery, while you  are taking narcotic pain medication, or until you feel that you are ready.   Avoid sitting or lying in bed for more than 2 hours at a time while you are awake to reduce your risk of blood clots.  You may return to work when directed by your physician.  Please contact your doctor if you need any return to work letters or medical leave paperwork to be completed.    WOUND CARE  You will have small incisions on your abdomen.  There will be dissolvable stitches under your skin that do not need to be removed.  You may have skin glue over the incisions or you may have paper strips which will fall off or can be removed 7 days after surgery.  If you have a pressure dressing or large band-aid over your incision, you may remove this 24-48 hours after surgery.  Shower daily after surgery. Clean your incision with mild unscented soap and water.  Pat your incision dry with a clean towel.  No tub baths or pools until your wounds are completely healed.    Wash your hands frequently, especially before touching your incision, changing any dressings, after using the restroom, and before eating.      WHAT TO EXPECT AT HOME  Recovery from surgery is generally 6 weeks, but sometimes longer for more strenuous activity.  It is normal to be very tired during this time.    It is normal to have some vaginal drainage or a small amount of vaginal bleeding after surgery which may last up to 6 weeks. If you experience heavy vaginal bleeding call your doctor immediately.  You may go home with a Toth catheter in your bladder for a few days, we will set up the removal if that is the case in the office.  You will most likely experience gas pain, abdominal swelling, or shoulder pain for 24-72 hours after surgery.  This is from the gas put into your abdomen to better visualize your organs.  A warm shower, heating pad, and/or walking may help.  You can place ice packs to the incision to help with pain and swelling.     WHEN TO CALL YOUR DOCTOR:  Fever  (>100.4?F or 38.0?C) or chills.  Incision problems such as redness, warmth, swelling, or foul smelling drainage.  Severe nausea or persistent vomiting.  Bright red vaginal bleeding (soaking >1 pad/hour) or foul smelling vaginal drainage.  Severe pain not relieved with pain medications.  Pain and swelling in your legs, especially if it is only on one side and not the other.  Pain with urination, cloudy urine, or foul smelling urine.  Or if you have any other problems or questions.    CALL 911 OR GO TO THE EMERGENCY ROOM IF YOU HAVE:  Any shortness of breath, difficulty breathing, or chest pain.      GYNECOLOGY PHYSICIAN CONTACT INFORMATION    Telephone: [phone]

## 2024-11-07 NOTE — ANESTHESIA POSTPROCEDURE EVALUATION
Patient: Eleonora Rodriguez    Procedure Summary       Date: 11/07/24 Room / Location: [facility] OR 08 / [facility]    Anesthesia Start: 0727 Anesthesia Stop: 0952    Procedure: Robotic Laparoscopic Hysterectomy with Bilateral Salpingectomy with TVT AP repair and Cystoscopy (Bilateral) Diagnosis:       Pelvic pain      Dyspareunia, female      RAFIQ (stress urinary incontinence, female)      Cystocele with rectocele      (Pelvic pain [R10.2])      (Dyspareunia, female [N94.10])      (Endometriosis [N80.9])    Surgeons: Antoinette Danielle DO Responsible Provider: Sylvia Brantley MD    Anesthesia Type: general ASA Status: 2            Anesthesia Type: general    Vitals Value Taken Time   BP 97/54 11/07/24 1315   Temp 36 °C (96.8 °F) 11/07/24 1315   Pulse 62 11/07/24 1319   Resp 12 11/07/24 1319   SpO2 98 % 11/07/24 1319   Vitals shown include unfiled device data.    Anesthesia Post Evaluation    Patient location during evaluation: PACU  Patient participation: complete - patient participated  Level of consciousness: sleepy but conscious, responsive to verbal stimuli, responsive to light touch and responsive to physical stimuli  Pain score: 0  Pain management: adequate  Airway patency: patent  Two or more strategies used to mitigate risk of obstructive sleep apnea  Cardiovascular status: acceptable, hemodynamically stable and stable  Respiratory status: acceptable, unassisted, spontaneous ventilation and nonlabored ventilation  Hydration status: balanced  Postoperative Nausea and Vomiting: none        There were no known notable events for this encounter.

## 2024-11-07 NOTE — ANESTHESIA PROCEDURE NOTES
Airway  Date/Time: 11/7/2024 7:46 AM  Urgency: elective    Airway not difficult    Staffing  Performed: HUDSON   Authorized by: Sylvia Brantley MD    Performed by: HUDSON Padron  Patient location during procedure: OR    Indications and Patient Condition  Indications for airway management: anesthesia  Spontaneous Ventilation: absent  Sedation level: deep  Preoxygenated: yes  Patient position: sniffing  MILS maintained throughout  Mask difficulty assessment: 1 - vent by mask  Planned trial extubation    Final Airway Details  Final airway type: endotracheal airway      Successful airway: ETT  Cuffed: yes   Successful intubation technique: direct laryngoscopy  Facilitating devices/methods: intubating stylet  Endotracheal tube insertion site: oral  Blade: Valentina  Blade size: #3  ETT size (mm): 7.0  Cormack-Lehane Classification: grade IIa - partial view of glottis  Placement verified by: chest auscultation   Measured from: lips  ETT to lips (cm): 22  Number of attempts at approach: 1  Ventilation between attempts: supraglottic airway

## 2024-11-07 NOTE — OP NOTE
Robotic Laparoscopic Hysterectomy with Bilateral Salpingectomy, TVT sling, Anterior and Posterior Repair and Cystoscopy Operative Note     Date: 2024  OR Location: [facility]    Name: Eleonora Rodriguez YOB: 1980, Age: 44 y.o., MRN: [id number], Sex: female    Diagnosis  Pre-op Diagnosis      * Pelvic pain [R10.2]     * Dyspareunia, female [N94.10]     * Endometriosis [N80.9]     * RAFIQ (stress urinary incontinence, female) [N39.3]     * Cystocele with rectocele [N81.10, N81.6] Post-op Diagnosis     * Pelvic pain [R10.2]     * Dyspareunia, female [N94.10]     * RAFIQ (stress urinary incontinence, female) [N39.3]     * Cystocele with rectocele [N81.10, N81.6]     Procedures  Robotic Laparoscopic Hysterectomy with Bilateral Salpingectomy with TVT AP repair and Cystoscopy  41454 - AR LAPS TOTAL HYSTERECT 250 GM/< W/RMVL TUBE/OVARY    Robotic Laparoscopic Hysterectomy with Bilateral Salpingectomy with TVT AP repair and Cystoscopy  33985 - AR CYSTOURETHROSCOPY    Robotic Laparoscopic Hysterectomy with Bilateral Salpingectomy with TVT AP repair and Cystoscopy  27764 - AR CMBND ANTERPOST COLPORRAPHY W/CYSTO W/NTRCL RPR    Robotic Laparoscopic Hysterectomy with Bilateral Salpingectomy with TVT AP repair and Cystoscopy  47728 - AR SLING OPERATION STRESS INCONTINENCE    Surgeons      * Antoinette Danielle - Primary    Resident/Fellow/Other Assistant:  Habibeh Gitiforooz, MD - surgeon, assistant     Staff:   Circulator: Kath Slaughter Person: Kaela  Surgical Assistant: Renetta    Anesthesia Staff: Anesthesiologist: Sylvia Brantley MD  C-AA: HUDSON Padron    Procedure Summary  Anesthesia: General  ASA: II  Estimated Blood Loss: 30mL  Intra-op Medications:   Administrations occurring from 0730 to 1000 on 24:   Medication Name Total Dose   BUPivacaine HCl (Marcaine) 0.25 % (2.5 mg/mL) injection 10 mL   lidocaine-epinephrine PF (Xylocaine W/EPI) 1 %-1:200,000 injection 29 mL   diphenhydrAMINE (BENADryl) injection 50 mg    famotidine PF (Pepcid) injection 20 mg   fentaNYL (Sublimaze) injection 50 mcg/mL 100 mcg   glycopyrrolate (Robinul) injection 0.2 mg   HYDROmorphone (Dilaudid) injection 1 mg/mL 0.5 mg   lidocaine PF (Xylocaine-MPF) local injection 2 % 60 mg   midazolam (Versed) injection 1 mg/mL 2 mg   ondansetron (Zofran) 2 mg/mL injection 4 mg   propofol (Diprivan) injection 10 mg/mL 685.06 mg   rocuronium (ZeMuron) 50 mg/5 mL injection 50 mg   ceFAZolin (Ancef) 2 g in dextrose (iso)  mL 2 g              Anesthesia Record               Intraprocedure I/O Totals       None           Specimen:   ID Type Source Tests Collected by Time   1 : UTERUS, CERVIX, BILATERAL TUBES Tissue UTERUS, CERVIX, AND BILATERAL FALLOPIAN TUBES SURGICAL PATHOLOGY EXAM Antoinette Danielle DO 11/7/2024 0914        Drains and/or Catheters: none    Implants:  Implants       Type Name Action Serial No.       ERIC MELENDEZ, BLUE TV - [id number] Implanted               Findings:   - Examination under anesthesia confirmed a midline, mobile, diffusely enlarged uterus measuring approximately 6 weeks in size.   - Upon visualization, the uterus appeared normal. Bilateral fallopian tubes and ovaries appeared normal. No obvious endometriosis lesions visualized. No adhesive disease in the abdomen or pelvic visualized. Prior surgery site from appendectomy appeared normal. The upper abdomen was visualized and appeared normal.  - Vaginally: small midline cystocele and rectocele present   -Cystoscopy, the bladder was found to be free of stones, injury or sutures.  There was no evidence of trauma or bleeding.  The bladder dome was intact.  Brisk ureteral jets were seen bilaterally.    Indications: Eleonora Rodriguez is an 44 y.o. female who is having surgery for Pelvic pain [R10.2]  Dyspareunia, female [N94.10]  Endometriosis [N80.9].     The patient was seen in the preoperative area. The risks, benefits, complications, treatment options, non-operative alternatives,  expected recovery and outcomes were discussed with the patient. The possibilities of reaction to medication, pulmonary aspiration, injury to surrounding structures, bleeding, recurrent infection, the need for additional procedures, failure to diagnose a condition, and creating a complication requiring transfusion or operation were discussed with the patient. The patient concurred with the proposed plan, giving informed consent.  The site of surgery was properly noted/marked if necessary per policy. The patient has been actively warmed in preoperative area. Preoperative antibiotics have been ordered and given within 1 hours of incision. Venous thrombosis prophylaxis have been ordered including bilateral sequential compression devices    Procedure Details:   The patient was brought to the operating room on a stretcher and placed on the operating table in supine position.  General anesthesia was obtained without difficulty.  The patient was then brought into the dorsal lithotomy position, and exam under anesthesia revealed those findings documented above. The patient was then prepped and draped in the usual sterile manner for a laparoscopic hysterectomy.  A time-out was performed and the patient's name, procedure, allergies, and preoperative medications were confirmed.    A Toth cathter was placed. Two retractors were inserted into the vagina. The cervix was grasped with a single-toothed tenaculum and the uterine manipulator device was then inserted into the uterine cavity without difficulty. The single-toothed tenaculum was removed and the device was secured.    Attention was then turned towards the abdomen. An Allis clamp was placed supra-umbilically and the skin was injected with 0.5% Marcaine. Using a #11 blade a supra-umbilical skin incision was made. Towel clamps were used to elevate the patient's pannus and a 8mm optical trocar was introduced into the abdomen. The abdomen was insufflated with carbon dioxide gas  to a final pressure of 15 mm Hg. Atraumatic entry was confirmed. The patient was placed in steep Trendelenburg. Three additional ports were placed under direct visualization. One 8 mm port to the left of the midline, approximately 8 cm apart. Next, one 8 mm port was placed to the right of the midline, approximately 8 cm apart and a 8 mm right sided assistant port was placed more laterally and inferior. The InferXi robot was then docked, all ports were secured to the robotic arms, and robotic instruments were introduced under direct visualization.    Survey of the pelvis revealed those findings described above. The ureter course was identified and outlined bilaterally.The left fallopian tube was placed on traction and the mesosalpinx was desiccated and transected. The tube was then desiccated and transected near the cornua, effectively freeing it of all attachments. The right fallopian tube was excised in similar fashion. The tubes were removed through 8 mm port to be sent to Pathology with the rest of the specimen. Due to complexity of the case, Dr. Srinivasan was present to assist with the surgery while I was working on the robotic .     Next, the left uteroovarian ligament and left round ligament, as well as the anterior and posterior leaves of the broad ligament, were serially coagulated and . The posterior peritoneum was taken down to the level of the colpotimizer cup. The anterior leaf of the broad ligament was opened and the bladder was dissected down below the level of the cervix creating the bladder flap. The uterine vessels were then identified, skeletonized, desiccated using bipolar forceps, and divided. There was significant aberrant vasculature in the area of the uterine's which caused some bleeding from the specimen. The uterine artery was dropped down to below the level of the colpotimizer cup. These steps were repeated in identical fashion on the patient's right side. A circumferential  colpotomy was made. The uterus and cervix were removed from the vagina. A glove with a ray tatum in it was placed in the vagina to maintain pneumoperitoneum. The vaginal cuff was then closed in a running fashion with 0 V-loc suture. Hemostasis was visualized. The cuff and all pedicles were irrigated and found to be hemostatic.     Next, the Toth catheter was removed and cystoscopy using a 30 degree scope was then performed and bilateral ureteral jets were noted. A 360 degree view of the bladder revealed no evidence of intraoperative injury. The bladder was then emptied. The glove was removed from the vagina. Vaginal sweep was performed and found to be negative.     Attention was turned to the abdomen again. The robotic instruments were removed. The robot was undocked. The patient was taken out of Trendelenburg and once again, hemostasis observed. The ports were removed under direct visualization. The abdomen was desufflated. The skin was closed with subcuticular sutures of 4-0 Monocryl and steri-strips.    Attention was turned towards vagina. Allis clamps were applied 2 cm before urethral meatus. Another Allis clamp was applied at apex of cystocele. The area between two Allis clamps was infiltrated with 1% lidocaine with epinephrine. Incision was made between the two Allis clamps. The edges were held with Allis clamps. The vaginal mucosa was dissected off gently. The suprapubic area was infiltrated with 1% lidocaine with epinephrine bilaterally with spinal needle. The Toth catheter balloon was held at the level of bladder neck. The Toth catheter was then removed. Catheter with stylet was inserted into the bladder and bladder was pushed to the opposite side. TVT trocar was placed on the right side while the stylet pushed the bladder to the opposite side. The stylet was removed. Cystoscopy was performed and no signs bladder injury, trocar or tape in the bladder. Trocar was pulled and held with Mellisa clamp. Same  steps were taken on the opposite side. Another cystoscopy was performed and again no signs bladder injury, trocar or tape in the bladder. Bladder was protected with Mellisa clamp and both plastic sheaths were removed at the same time. The tape was trimmed at the level of skin and closed with skin glue. Attention was turned toward repair of cystocele. 0 Vicryl suture was used to plicate the fascia in an interrupted fashion. Next, the extra vaginal mucosa was trimmed bilaterally and closed with 0 Vicryl suture in running fashion. Attention was turned to rectocele repair. Allis clamp was placed at the apex and dependant part of the rectocele. The area was infiltrated with 1% lidocaine with epinephrine. Incision was made between two clamps. The mucosa was dissected and plication suture was used with 0 vicryl suture. Next, extra vaginal mucosa was trimmed bilaterally and closed with 0 vicryl suture. The vagina was the irrigated and hemostasis observed. The Toth cathter was removed.     All sponge and instrument counts were correct upon conclusion of the case. The patient was extubated and transferred to the recovery room in stable condition.    Complications:  None; patient tolerated the procedure well.    Disposition: PACU - hemodynamically stable.  Condition: stable     Antoinette Danielle  Phone Number: [phone]

## 2024-11-21 LAB
LABORATORY COMMENT REPORT: NORMAL
PATH REPORT.FINAL DX SPEC: NORMAL
PATH REPORT.GROSS SPEC: NORMAL
PATH REPORT.RELEVANT HX SPEC: NORMAL
PATH REPORT.TOTAL CANCER: NORMAL

## (undated) DEVICE — SOLUTION, INJECTION, SODIUM CHLORIDE 9%, 3000ML

## (undated) DEVICE — SCISSORS, ROUND TIP, DAVINCI XI

## (undated) DEVICE — Device

## (undated) DEVICE — DRIVER, NEEDLE, MEGA SUTURE CUT, DAVINCI XI

## (undated) DEVICE — POSITIONING, THE PINK PAD, PIGAZZI SYSTEM

## (undated) DEVICE — TIP, SUCTION, YANKAUER, BULB, OPEN TIP, W/O CONTROL VENT, LF, CLEAR

## (undated) DEVICE — ADHESIVE, SKIN, MASTISOL, 2/3 CC VIAL

## (undated) DEVICE — ADHESIVE, SKIN, DERMABOND ADVANCED, 15CM, PEN-STYLE

## (undated) DEVICE — CARE KIT, LAPAROSCOPIC, ADVANCED

## (undated) DEVICE — SUTURE,  V-LOC,  0 GS-21 6IN

## (undated) DEVICE — MANIPULATOR, ADVINCULA DELINEATOR, 3.0CM

## (undated) DEVICE — FILTER, LAPAROSCOPIC, PLUME PORT, W/LUER CONNECTOR, STERILE

## (undated) DEVICE — COVER, TIP HOT SHEARS ENDOWRIST

## (undated) DEVICE — GOWN, SURGICAL, SIRUS, NON REINFORCED, LARGE

## (undated) DEVICE — APPLICATOR, CHLORAPREP, W/ORANGE TINT, 26ML

## (undated) DEVICE — SOLUTION, IRRIGATION, SODIUM CHLORIDE 0.9%, 1000 ML, POUR BOTTLE

## (undated) DEVICE — STRIP, SKIN CLOSURE, STERI STRIP, REINFORCED, 0.5 X 4 IN

## (undated) DEVICE — TUBING, HIGH FLOW, INSUFFLATOR, W/FILTER

## (undated) DEVICE — DRIVER, NEEDLE LARGE, DAVINCI XI

## (undated) DEVICE — COVER, MAYO STAND, 23-1/2 X 56, LF

## (undated) DEVICE — GLOVE, SURGICAL, PROTEXIS PI BLUE W/NEUTHERA, 6.5, PF, LF

## (undated) DEVICE — SOLUTION, IRRIGATION, STERILE WATER, 1000 ML, POUR BOTTLE

## (undated) DEVICE — SYRINGE, 10 CC, LUER LOCK

## (undated) DEVICE — DRAPE, ARM XI

## (undated) DEVICE — PREP TRAY, VAGINAL

## (undated) DEVICE — OBTURATOR, BLADELESS , SU

## (undated) DEVICE — TRAY, SURESTEP, SILICONE DRAINAGE BAG, STATLOCK, 16FR

## (undated) DEVICE — DRAPE, COLUMN, DAVINCI XI

## (undated) DEVICE — DRAPE, SHEET, THREE QUARTER, FAN FOLD, 57 X 77 IN

## (undated) DEVICE — SEAL, UNIVERSAL, 5-12MM

## (undated) DEVICE — IRRIGATION SET, CYSTOSCOPY, REGULATING CLAMP, STRAIGHT, 81 IN

## (undated) DEVICE — SUTURE, VICRYL, 0, 27 IN, UR-6, VIOLET

## (undated) DEVICE — SUTURE, MONOCRYL, 4-0, 27 IN, PS-2, UNDYED

## (undated) DEVICE — ASSEMBLY, STRYKER FLOW 2, SUCTION IRRIGATOR, WITH TIP